# Patient Record
Sex: FEMALE | Race: WHITE
[De-identification: names, ages, dates, MRNs, and addresses within clinical notes are randomized per-mention and may not be internally consistent; named-entity substitution may affect disease eponyms.]

---

## 2020-02-10 LAB
ALBUMIN SERPL-MCNC: 4.2 G/DL (ref 3.5–5)
ALP SERPL-CCNC: 63 U/L (ref 38–126)
ANION GAP SERPL CALC-SCNC: 9 MMOL/L (ref 5–19)
APPEARANCE UR: (no result)
APTT PPP: YELLOW S
AST SERPL-CCNC: 18 U/L (ref 14–36)
BILIRUB DIRECT SERPL-MCNC: 0.2 MG/DL (ref 0–0.4)
BILIRUB SERPL-MCNC: 0.6 MG/DL (ref 0.2–1.3)
BILIRUB UR QL STRIP: NEGATIVE
BUN SERPL-MCNC: 9 MG/DL (ref 7–20)
CALCIUM: 9.8 MG/DL (ref 8.4–10.2)
CHLORIDE SERPL-SCNC: 104 MMOL/L (ref 98–107)
CO2 SERPL-SCNC: 28 MMOL/L (ref 22–30)
ERYTHROCYTE [DISTWIDTH] IN BLOOD BY AUTOMATED COUNT: 13.4 % (ref 11.5–14)
GLUCOSE SERPL-MCNC: 79 MG/DL (ref 75–110)
GLUCOSE UR STRIP-MCNC: NEGATIVE MG/DL
HCT VFR BLD CALC: 41.9 % (ref 36–47)
HGB BLD-MCNC: 14.6 G/DL (ref 12–15.5)
KETONES UR STRIP-MCNC: NEGATIVE MG/DL
MCH RBC QN AUTO: 31.6 PG (ref 27–33.4)
MCHC RBC AUTO-ENTMCNC: 34.8 G/DL (ref 32–36)
MCV RBC AUTO: 91 FL (ref 80–97)
NITRITE UR QL STRIP: NEGATIVE
PH UR STRIP: 7 [PH] (ref 5–9)
PLATELET # BLD: 412 10^3/UL (ref 150–450)
POTASSIUM SERPL-SCNC: 4.1 MMOL/L (ref 3.6–5)
PROT SERPL-MCNC: 7.4 G/DL (ref 6.3–8.2)
PROT UR STRIP-MCNC: NEGATIVE MG/DL
RBC # BLD AUTO: 4.6 10^6/UL (ref 3.72–5.28)
SP GR UR STRIP: 1.01
UROBILINOGEN UR-MCNC: NEGATIVE MG/DL (ref ?–2)
WBC # BLD AUTO: 6.8 10^3/UL (ref 4–10.5)

## 2020-02-10 NOTE — RADIOLOGY REPORT (SQ)
EXAM DESCRIPTION:  CHEST PA/LATERAL



COMPLETED DATE/TIME:  2/10/2020 9:49 am



REASON FOR STUDY:  PRE-OP



COMPARISON:  None.



EXAM PARAMETERS:  NUMBER OF VIEWS: two views

TECHNIQUE:  PA and lateral views of the chest were obtained.

RADIATION DOSE: NA

LIMITATIONS: none



FINDINGS:  LUNGS AND PLEURA: No consolidation, pleural effusion or pneumothorax.

MEDIASTINUM AND HILAR STRUCTURES: No mediastinal or hilar contour abnormality.

HEART AND VASCULAR STRUCTURES: The cardiac silhouette and pulmonary vasculature are within normal fritz
its.

BONES: No acute findings.

HARDWARE: None in the chest.

OTHER: No other finding.



IMPRESSION:  No acute cardiopulmonary process.



TECHNICAL DOCUMENTATION:  JOB ID:  [id number]

 2011 [employer]- All Rights Reserved



Reading location - IP/workstation name: [email or web address]

## 2020-02-27 ENCOUNTER — HOSPITAL ENCOUNTER (OUTPATIENT)
Dept: HOSPITAL 62 - OROUT | Age: 40
LOS: 1 days | Discharge: HOME | End: 2020-02-28
Attending: OBSTETRICS & GYNECOLOGY
Payer: OTHER GOVERNMENT

## 2020-02-27 DIAGNOSIS — N87.0: ICD-10-CM

## 2020-02-27 DIAGNOSIS — N93.8: Primary | ICD-10-CM

## 2020-02-27 DIAGNOSIS — F17.210: ICD-10-CM

## 2020-02-27 DIAGNOSIS — R10.2: ICD-10-CM

## 2020-02-27 DIAGNOSIS — N93.9: ICD-10-CM

## 2020-02-27 DIAGNOSIS — N73.6: ICD-10-CM

## 2020-02-27 PROCEDURE — A4649 SURGICAL SUPPLIES: HCPCS

## 2020-02-27 PROCEDURE — 86901 BLOOD TYPING SEROLOGIC RH(D): CPT

## 2020-02-27 PROCEDURE — 86850 RBC ANTIBODY SCREEN: CPT

## 2020-02-27 PROCEDURE — 71046 X-RAY EXAM CHEST 2 VIEWS: CPT

## 2020-02-27 PROCEDURE — 85025 COMPLETE CBC W/AUTO DIFF WBC: CPT

## 2020-02-27 PROCEDURE — 58571 TLH W/T/O 250 G OR LESS: CPT

## 2020-02-27 PROCEDURE — 81025 URINE PREGNANCY TEST: CPT

## 2020-02-27 PROCEDURE — 81001 URINALYSIS AUTO W/SCOPE: CPT

## 2020-02-27 PROCEDURE — 88307 TISSUE EXAM BY PATHOLOGIST: CPT

## 2020-02-27 PROCEDURE — 80053 COMPREHEN METABOLIC PANEL: CPT

## 2020-02-27 PROCEDURE — 86900 BLOOD TYPING SEROLOGIC ABO: CPT

## 2020-02-27 PROCEDURE — 93005 ELECTROCARDIOGRAM TRACING: CPT

## 2020-02-27 PROCEDURE — 36415 COLL VENOUS BLD VENIPUNCTURE: CPT

## 2020-02-27 PROCEDURE — 93010 ELECTROCARDIOGRAM REPORT: CPT

## 2020-02-27 PROCEDURE — 85027 COMPLETE CBC AUTOMATED: CPT

## 2020-02-27 RX ADMIN — FENTANYL CITRATE ONE MCG: 50 INJECTION INTRAMUSCULAR; INTRAVENOUS at 12:30

## 2020-02-27 RX ADMIN — HYDROMORPHONE HYDROCHLORIDE ONE MG: 2 INJECTION INTRAMUSCULAR; INTRAVENOUS; SUBCUTANEOUS at 12:50

## 2020-02-27 RX ADMIN — OXYCODONE AND ACETAMINOPHEN PRN TAB: 5; 325 TABLET ORAL at 20:14

## 2020-02-27 RX ADMIN — FENTANYL CITRATE ONE MCG: 50 INJECTION INTRAMUSCULAR; INTRAVENOUS at 12:25

## 2020-02-27 RX ADMIN — HYDROMORPHONE HYDROCHLORIDE ONE MG: 2 INJECTION INTRAMUSCULAR; INTRAVENOUS; SUBCUTANEOUS at 12:40

## 2020-02-27 RX ADMIN — KETOROLAC TROMETHAMINE SCH MG: 30 INJECTION, SOLUTION INTRAMUSCULAR at 18:08

## 2020-02-27 NOTE — OPERATIVE REPORT
Operative Report


DATE OF SURGERY: 02/27/20


PREOPERATIVE DIAGNOSIS: Abnormal uterine bleeding, enlarged uterus, pelvic pain


POSTOPERATIVE DIAGNOSIS: Same


OPERATION: robotic assisted total laparoscopic hysterectomy bilateral 

salpingectomy


SURGEON: JODI BUCHANAN


1ST ASSISTANT: TINO THOMPSON


2ND Assistant: JANE HAHN


ANESTHESIA: GA


TISSUE REMOVED OR ALTERED: Uterus cervix and bilateral fallopian tubes


COMPLICATIONS: 





None


ESTIMATED BLOOD LOSS: 150 cc


INTRAOPERATIVE FINDINGS: Enlarged uterus consistent with probable adenomyosis, 

dense adhesions of the vesicouterine segment to the lower uterine segment at the

site of hysterotomy, normal ovaries,


PROCEDURE: 





Patient was taken to the operating room prepared and draped in normal sterile 

fashion in dorsolithotomy position. Under sterile conditions a Duggan catheter 

was placed to gravity. Speculum was placed into the vagina and the cervix was 

grasped on the anterior lip with a single-tooth tenaculum. The cervix was then 

dilated to accommodate a medium V care uterine manipulator. Manipulator was 

placed, gloves were changed and attention was turned to the upper portion of the

case. A 2-1/2 cm umbilical skin incision was made 11 blade and this was carried 

through to the underlying layer of fascia with the same 11 blade. It was grasped

to Marisabel's acted with Eric's.  Peritoneal cavity was entered bluntly. A GelPort 

was placed in a normal fashion the camera port and air seal in the appropriate 

locations. Andrade was then inflated with approximately 2 L of CO2 gas. The 

camera was then introduced into the peritoneal cavity through the camera port 

and the patient was placed in steep Trendelenburg. The above findings were 

noted. Under direct visualization two 5 mm ports were placed approximately 10 cm

on either side of the umbilicus. The robot was then docked with the vessel 

sealer placed on the patient's left and the monopolar scissors placed placed on 

the patient's right. I then unscrubbed and set at the robotic console beginning 

with the left adnexa fallopian tube was transected from the uterus using the 

vessel sealer and monopolar scissors as needed. The fallopian tube was then 

removed through the assistance port. The ovarian ligament was then transected 

using the vessel sealer. The uterine artery was skeletonized using blunt 

dissection and ligated using the vessel sealer down to the level of the external

cervical os. The bladder flap was then begun using monopolar scissors and blunt 

dissection over the V care cup noted through the mucosa. Attention was then 

turned to the right adnexa where the fallopian tube was transected in a similar 

fashion. The utero-ovarian ligament was transected using the vessel sealer. The 

Uterine artery was then transected using the vessel sealer and skeletonized 

using blunt dissection. The vessel sealer was again used to completely transect 

the uterine artery down to the level of the external cervical os. The bladder 

flap was completed using similar sharp and blunt dissection. Once the bladder 

was felt to be adequately away from the lower uterine segment, the colpotomy was

begun on the anterior aspect of the cervix following the outline of the V care 

cup mucosa. The cup was followed in a circumferential fashion completely around 

the cervix estimate was completely freed. The specimen was then removed through 

the vaginal defect. The instruments were then changed to a Ariel needle  

and pro-grasp. AV lock needle was introduced through the assistance port. The 

lock needle was used to close the vaginal cuff and hemostasis. The needle was 

then removed through the assistance port. The peritoneal cavity was carefully 

inspected the ureters were noted to both be peristalsing and there was no signs 

of hydroureter. The robot was then undocked. The fascia was closed at the 

umbilical skin incision seen 0 Vicryl 3 skin incisions were closed using 4-0 

Vicryl. Sponge lap and needle counts were correct x2 and the patient was taken 

to recovery in stable condition.

## 2020-02-28 VITALS — DIASTOLIC BLOOD PRESSURE: 64 MMHG | SYSTOLIC BLOOD PRESSURE: 117 MMHG

## 2020-02-28 LAB
ADD MANUAL DIFF: NO
BASOPHILS # BLD AUTO: 0 10^3/UL (ref 0–0.2)
BASOPHILS NFR BLD AUTO: 0.1 % (ref 0–2)
EOSINOPHIL # BLD AUTO: 0 10^3/UL (ref 0–0.6)
EOSINOPHIL NFR BLD AUTO: 0.1 % (ref 0–6)
ERYTHROCYTE [DISTWIDTH] IN BLOOD BY AUTOMATED COUNT: 13.1 % (ref 11.5–14)
HCT VFR BLD CALC: 34 % (ref 36–47)
HGB BLD-MCNC: 11.5 G/DL (ref 12–15.5)
LYMPHOCYTES # BLD AUTO: 2.1 10^3/UL (ref 0.5–4.7)
LYMPHOCYTES NFR BLD AUTO: 14.7 % (ref 13–45)
MCH RBC QN AUTO: 30.7 PG (ref 27–33.4)
MCHC RBC AUTO-ENTMCNC: 33.7 G/DL (ref 32–36)
MCV RBC AUTO: 91 FL (ref 80–97)
MONOCYTES # BLD AUTO: 1 10^3/UL (ref 0.1–1.4)
MONOCYTES NFR BLD AUTO: 6.9 % (ref 3–13)
NEUTROPHILS # BLD AUTO: 11.4 10^3/UL (ref 1.7–8.2)
NEUTS SEG NFR BLD AUTO: 78.2 % (ref 42–78)
PLATELET # BLD: 372 10^3/UL (ref 150–450)
RBC # BLD AUTO: 3.74 10^6/UL (ref 3.72–5.28)
TOTAL CELLS COUNTED % (AUTO): 100 %
WBC # BLD AUTO: 14.5 10^3/UL (ref 4–10.5)

## 2020-02-28 RX ADMIN — OXYCODONE AND ACETAMINOPHEN PRN TAB: 5; 325 TABLET ORAL at 06:16

## 2020-02-28 RX ADMIN — KETOROLAC TROMETHAMINE SCH MG: 30 INJECTION, SOLUTION INTRAMUSCULAR at 01:59

## 2020-02-28 RX ADMIN — KETOROLAC TROMETHAMINE SCH: 30 INJECTION, SOLUTION INTRAMUSCULAR at 10:01

## 2020-02-28 NOTE — PDOC DISCHARGE SUMMARY
Impression





- Admit/DC Date/PCP


Admission Date/Primary Care Provider: 


  





  [facility]





Discharge Date: 02/28/20





- Discharge Diagnosis


(1) Abnormal uterine bleeding


Is this a current diagnosis for this admission?: Yes   





(2) Pelvic pain


Is this a current diagnosis for this admission?: Yes   





(3) Pelvic adhesions


Is this a current diagnosis for this admission?: Yes   





- Assessment


Summary: 


underwent RATLH w/ bilateral salpingectomy without complication.  patient 

voiding well and tolerating a regular diet





- Additional Information


Resuscitation Status: Full Code


Discharge Diet: As Tolerated


Discharge Activity: Balance Activity w/Rest, No Driving, No Lifting Over 10 

Pounds, No Lifting/Push/Pulling, Pelvic Rest, No tub bath


Referrals: 


[facility] [Primary Care Provider] - 


Prescriptions: 


Oxycodone HCl/Acetaminophen [Percocet 5-325 mg Tablet] 1 tab PO Q6HP PRN #30 

tablet


 PRN Reason: 


Ibuprofen [Motrin 800 mg Tablet] 800 mg PO Q8HP PRN #60 tablet


 PRN Reason: 


Home Medications: 








Citalopram Hydrobromide [Celexa] 1 tab PO DAILY 02/10/20 


Dicyclomine HCl [Bentyl 10 mg Capsule] 10 mg PO DAILY 02/10/20 


Norethindrone-Ethin. Estradiol [Cyclafem 1-35-28 Tablet] 1 each PO DAILY 

02/10/20 


Tramadol HCl [Ultram] 50 mg PO PRN PRN 02/10/20 


Ibuprofen [Motrin 800 mg Tablet] 800 mg PO Q8HP PRN #60 tablet 02/28/20 


Oxycodone HCl/Acetaminophen [Percocet 5-325 mg Tablet] 1 tab PO Q6HP PRN #30 

tablet 02/28/20 











History of Present Illiness


History of Present Illness: 


KRISSY BOO is a 39 year old female








Physical Exam





- Physical Exam


Vital Signs: 


                                        











Temp Pulse Resp BP Pulse Ox


 


 97.6 F   53 L  12   112/52 L  99 


 


 02/28/20 04:10  02/28/20 04:10  02/28/20 04:10  02/28/20 04:10  02/28/20 04:10








                                 Intake & Output











 02/27/20 02/28/20 02/29/20





 06:59 06:59 06:59


 


Intake Total  1850 


 


Output Total  700 


 


Balance  1150 


 


Weight  73.6 kg 














Results


Laboratory Results: 


                                        











WBC  14.5 10^3/uL (4.0-10.5)  H  02/28/20  05:10    


 


RBC  3.74 10^6/uL (3.72-5.28)   02/28/20  05:10    


 


Hgb  11.5 g/dL (12.0-15.5)  L  02/28/20  05:10    


 


Hct  34.0 % (36.0-47.0)  L  02/28/20  05:10    


 


MCV  91 fl (80-97)   02/28/20  05:10    


 


MCH  30.7 pg (27.0-33.4)   02/28/20  05:10    


 


MCHC  33.7 g/dL (32.0-36.0)   02/28/20  05:10    


 


RDW  13.1 % (11.5-14.0)   02/28/20  05:10    


 


Plt Count  372 10^3/uL (150-450)   02/28/20  05:10    


 


Lymph % (Auto)  14.7 % (13-45)   02/28/20  05:10    


 


Mono % (Auto)  6.9 % (3-13)   02/28/20  05:10    


 


Eos % (Auto)  0.1 % (0-6)   02/28/20  05:10    


 


Baso % (Auto)  0.1 % (0-2)   02/28/20  05:10    


 


Absolute Neuts (auto)  11.4 10^3/uL (1.7-8.2)  H  02/28/20  05:10    


 


Absolute Lymphs (auto)  2.1 10^3/uL (0.5-4.7)   02/28/20  05:10    


 


Absolute Monos (auto)  1.0 10^3/uL (0.1-1.4)   02/28/20  05:10    


 


Absolute Eos (auto)  0.0 10^3/uL (0.0-0.6)   02/28/20  05:10    


 


Absolute Basos (auto)  0.0 10^3/uL (0.0-0.2)   02/28/20  05:10    


 


Seg Neutrophils %  78.2 % (42-78)  H  02/28/20  05:10    


 


Sodium  140.5 mmol/L (137-145)   02/10/20  09:31    


 


Potassium  4.1 mmol/L (3.6-5.0)   02/10/20  09:31    


 


Chloride  104 mmol/L ()   02/10/20  09:31    


 


Carbon Dioxide  28 mmol/L (22-30)   02/10/20  09:31    


 


Anion Gap  9  (5-19)   02/10/20  09:31    


 


BUN  9 mg/dL (7-20)   02/10/20  09:31    


 


Creatinine  0.71 mg/dL (0.52-1.25)   02/10/20  09:31    


 


Est GFR ( Amer)  > 60  (>60)   02/10/20  09:31    


 


Est GFR (MDRD) Non-Af  > 60  (>60)   02/10/20  09:31    


 


Glucose  79 mg/dL ()   02/10/20  09:31    


 


Calcium  9.8 mg/dL (8.4-10.2)   02/10/20  09:31    


 


Total Bilirubin  0.6 mg/dL (0.2-1.3)   02/10/20  09:31    


 


Direct Bilirubin  0.2 mg/dL (0.0-0.4)   02/10/20  09:31    


 


Neonat Total Bilirubin  Not Reportable   02/10/20  09:31    


 


Neonat Direct Bilirubin  Not Reportable   02/10/20  09:31    


 


Neonat Indirect Bili  Not Reportable   02/10/20  09:31    


 


AST  18 U/L (14-36)   02/10/20  09:31    


 


ALT  16 U/L (<35)   02/10/20  09:31    


 


Alkaline Phosphatase  63 U/L ()   02/10/20  09:31    


 


Total Protein  7.4 g/dL (6.3-8.2)   02/10/20  09:31    


 


Albumin  4.2 g/dL (3.5-5.0)   02/10/20  09:31    


 


Urine Color  YELLOW   02/10/20  09:10    


 


Urine Appearance  SLIGHTLY-CLOUDY   02/10/20  09:10    


 


Urine pH  7.0  (5.0-9.0)   02/10/20  09:10    


 


Ur Specific Gravity  1.006   02/10/20  09:10    


 


Urine Protein  NEGATIVE mg/dL (NEGATIVE)   02/10/20  09:10    


 


Urine Glucose (UA)  NEGATIVE mg/dL (NEGATIVE)   02/10/20  09:10    


 


Urine Ketones  NEGATIVE mg/dL (NEGATIVE)   02/10/20  09:10    


 


Urine Blood  LARGE  (NEGATIVE)  H  02/10/20  09:10    


 


Urine Nitrite  NEGATIVE  (NEGATIVE)   02/10/20  09:10    


 


Urine Bilirubin  NEGATIVE  (NEGATIVE)   02/10/20  09:10    


 


Urine Urobilinogen  NEGATIVE mg/dL (<2.0)   02/10/20  09:10    


 


Ur Leukocyte Esterase  NEGATIVE  (NEGATIVE)   02/10/20  09:10    


 


Urine WBC (Auto)  3 /HPF  02/10/20  09:10    


 


Urine RBC (Auto)  1 /HPF  02/10/20  09:10    


 


Squamous Epi Cells Auto  5 /HPF  02/10/20  09:10    


 


Urine Mucus (Auto)  OCC /LPF  02/10/20  09:10    


 


Urine Ascorbic Acid  NEGATIVE  (NEGATIVE)   02/10/20  09:10    


 


Urine HCG, Qual  NEGATIVE  (NEGATIVE)   02/27/20  08:30    


 


Blood Type  O NEGATIVE   02/26/20  13:15    


 


Antibody Screen  NEGATIVE   02/26/20  13:15    











Impressions: 


                                        





Chest X-Ray  02/10/20 09:43


IMPRESSION:  No acute cardiopulmonary process.


 














Stroke


Is this a Stroke Patient?: No





Acute Heart Failure





- **


Is this a Heart Failure Patient?: No